# Patient Record
Sex: MALE | Race: WHITE | Employment: OTHER | URBAN - METROPOLITAN AREA
[De-identification: names, ages, dates, MRNs, and addresses within clinical notes are randomized per-mention and may not be internally consistent; named-entity substitution may affect disease eponyms.]

---

## 2022-09-09 ENCOUNTER — HOSPITAL ENCOUNTER (EMERGENCY)
Age: 82
Discharge: HOME | End: 2022-09-10
Attending: EMERGENCY MEDICINE
Payer: MEDICARE

## 2022-09-09 ENCOUNTER — APPOINTMENT (OUTPATIENT)
Dept: GENERAL RADIOLOGY | Age: 82
End: 2022-09-09
Payer: MEDICARE

## 2022-09-09 ENCOUNTER — APPOINTMENT (OUTPATIENT)
Dept: CT IMAGING | Age: 82
End: 2022-09-09
Payer: MEDICARE

## 2022-09-09 DIAGNOSIS — S12.111A CLOSED ODONTOID FRACTURE WITH TYPE II MORPHOLOGY AND POSTERIOR DISPLACEMENT, INITIAL ENCOUNTER (HCC): Primary | ICD-10-CM

## 2022-09-09 DIAGNOSIS — S12.090A CLOSED FRACTURE OF ANTERIOR ARCH OF ATLAS, INITIAL ENCOUNTER (HCC): ICD-10-CM

## 2022-09-09 LAB
ABSOLUTE EOS #: 0.08 K/UL (ref 0–0.44)
ABSOLUTE IMMATURE GRANULOCYTE: <0.03 K/UL (ref 0–0.3)
ABSOLUTE LYMPH #: 1.23 K/UL (ref 1.1–3.7)
ABSOLUTE MONO #: 0.58 K/UL (ref 0.1–1.2)
ALBUMIN SERPL-MCNC: 4.3 G/DL (ref 3.5–5.2)
ALBUMIN/GLOBULIN RATIO: 1.5 (ref 1–2.5)
ALP BLD-CCNC: 108 U/L (ref 40–129)
ALT SERPL-CCNC: 22 U/L (ref 5–41)
ANION GAP SERPL CALCULATED.3IONS-SCNC: 12 MMOL/L (ref 9–17)
AST SERPL-CCNC: 23 U/L
BASOPHILS # BLD: 1 % (ref 0–2)
BASOPHILS ABSOLUTE: 0.03 K/UL (ref 0–0.2)
BILIRUB SERPL-MCNC: 0.3 MG/DL (ref 0.3–1.2)
BUN BLDV-MCNC: 14 MG/DL (ref 8–23)
CALCIUM SERPL-MCNC: 9.7 MG/DL (ref 8.6–10.4)
CHLORIDE BLD-SCNC: 101 MMOL/L (ref 98–107)
CHP ED QC CHECK: YES
CO2: 28 MMOL/L (ref 20–31)
CREAT SERPL-MCNC: 0.83 MG/DL (ref 0.7–1.2)
EOSINOPHILS RELATIVE PERCENT: 1 % (ref 1–4)
GFR AFRICAN AMERICAN: >60 ML/MIN
GFR NON-AFRICAN AMERICAN: >60 ML/MIN
GFR SERPL CREATININE-BSD FRML MDRD: NORMAL ML/MIN/{1.73_M2}
GLUCOSE BLD-MCNC: 142 MG/DL
GLUCOSE BLD-MCNC: 94 MG/DL (ref 70–99)
HCT VFR BLD CALC: 41.3 % (ref 40.7–50.3)
HEMOGLOBIN: 13.3 G/DL (ref 13–17)
IMMATURE GRANULOCYTES: 0 %
LYMPHOCYTES # BLD: 19 % (ref 24–43)
MCH RBC QN AUTO: 28.2 PG (ref 25.2–33.5)
MCHC RBC AUTO-ENTMCNC: 32.2 G/DL (ref 28.4–34.8)
MCV RBC AUTO: 87.5 FL (ref 82.6–102.9)
MONOCYTES # BLD: 9 % (ref 3–12)
NRBC AUTOMATED: 0 PER 100 WBC
PDW BLD-RTO: 12.7 % (ref 11.8–14.4)
PLATELET # BLD: 161 K/UL (ref 138–453)
PMV BLD AUTO: 10 FL (ref 8.1–13.5)
POTASSIUM SERPL-SCNC: 3.8 MMOL/L (ref 3.7–5.3)
RBC # BLD: 4.72 M/UL (ref 4.21–5.77)
SARS-COV-2, RAPID: NOT DETECTED
SEG NEUTROPHILS: 70 % (ref 36–65)
SEGMENTED NEUTROPHILS ABSOLUTE COUNT: 4.46 K/UL (ref 1.5–8.1)
SODIUM BLD-SCNC: 141 MMOL/L (ref 135–144)
SPECIMEN DESCRIPTION: NORMAL
TOTAL PROTEIN: 7.2 G/DL (ref 6.4–8.3)
TROPONIN, HIGH SENSITIVITY: 17 NG/L (ref 0–22)
WBC # BLD: 6.4 K/UL (ref 3.5–11.3)

## 2022-09-09 PROCEDURE — 6360000002 HC RX W HCPCS: Performed by: STUDENT IN AN ORGANIZED HEALTH CARE EDUCATION/TRAINING PROGRAM

## 2022-09-09 PROCEDURE — 96374 THER/PROPH/DIAG INJ IV PUSH: CPT

## 2022-09-09 PROCEDURE — 93005 ELECTROCARDIOGRAM TRACING: CPT | Performed by: STUDENT IN AN ORGANIZED HEALTH CARE EDUCATION/TRAINING PROGRAM

## 2022-09-09 PROCEDURE — 80053 COMPREHEN METABOLIC PANEL: CPT

## 2022-09-09 PROCEDURE — 99223 1ST HOSP IP/OBS HIGH 75: CPT | Performed by: NEUROLOGICAL SURGERY

## 2022-09-09 PROCEDURE — 99285 EMERGENCY DEPT VISIT HI MDM: CPT

## 2022-09-09 PROCEDURE — 70450 CT HEAD/BRAIN W/O DYE: CPT

## 2022-09-09 PROCEDURE — 72040 X-RAY EXAM NECK SPINE 2-3 VW: CPT

## 2022-09-09 PROCEDURE — 84484 ASSAY OF TROPONIN QUANT: CPT

## 2022-09-09 PROCEDURE — 87635 SARS-COV-2 COVID-19 AMP PRB: CPT

## 2022-09-09 PROCEDURE — 85025 COMPLETE CBC W/AUTO DIFF WBC: CPT

## 2022-09-09 PROCEDURE — 72125 CT NECK SPINE W/O DYE: CPT

## 2022-09-09 RX ORDER — LOSARTAN POTASSIUM 25 MG/1
25 TABLET ORAL 2 TIMES DAILY
COMMUNITY

## 2022-09-09 RX ORDER — GLIMEPIRIDE 4 MG/1
4 TABLET ORAL 2 TIMES DAILY
COMMUNITY

## 2022-09-09 RX ORDER — FAMOTIDINE 40 MG/1
40 TABLET, FILM COATED ORAL DAILY
COMMUNITY

## 2022-09-09 RX ORDER — MEMANTINE HYDROCHLORIDE 10 MG/1
10 TABLET ORAL 2 TIMES DAILY
COMMUNITY

## 2022-09-09 RX ORDER — OXYBUTYNIN CHLORIDE 5 MG/1
5 TABLET, EXTENDED RELEASE ORAL DAILY
COMMUNITY

## 2022-09-09 RX ORDER — BUSPIRONE HYDROCHLORIDE 10 MG/1
30 TABLET ORAL 2 TIMES DAILY
COMMUNITY

## 2022-09-09 RX ORDER — SERTRALINE HYDROCHLORIDE 200 MG/1
CAPSULE ORAL
COMMUNITY

## 2022-09-09 RX ORDER — KETOROLAC TROMETHAMINE 15 MG/ML
15 INJECTION, SOLUTION INTRAMUSCULAR; INTRAVENOUS ONCE
Status: COMPLETED | OUTPATIENT
Start: 2022-09-09 | End: 2022-09-09

## 2022-09-09 RX ADMIN — KETOROLAC TROMETHAMINE 15 MG: 15 INJECTION, SOLUTION INTRAMUSCULAR; INTRAVENOUS at 17:53

## 2022-09-09 ASSESSMENT — PAIN DESCRIPTION - PAIN TYPE: TYPE: ACUTE PAIN

## 2022-09-09 ASSESSMENT — PAIN - FUNCTIONAL ASSESSMENT: PAIN_FUNCTIONAL_ASSESSMENT: 0-10

## 2022-09-09 ASSESSMENT — PAIN DESCRIPTION - DESCRIPTORS: DESCRIPTORS: ACHING

## 2022-09-09 ASSESSMENT — PAIN DESCRIPTION - LOCATION
LOCATION: NECK
LOCATION: HEAD;NECK

## 2022-09-09 ASSESSMENT — PAIN SCALES - GENERAL
PAINLEVEL_OUTOF10: 8
PAINLEVEL_OUTOF10: 8

## 2022-09-09 ASSESSMENT — PAIN DESCRIPTION - FREQUENCY: FREQUENCY: CONTINUOUS

## 2022-09-09 NOTE — ED NOTES
Patient arrives via [facility] from a rehab facility after falling out of a wheelchair and hitting his head. No LOC, no blood thinners. Patient is in rehab for a previous C1-C2 fracture. Patient arrives in a C-Collar with his only complaint being neck pain. Patient is alert and oriented x4.      Dagmar Rueda RN  09/09/22 0484

## 2022-09-09 NOTE — ED PROVIDER NOTES
[address] HANDOFF       Handoff taken on the following patient from prior Attending Physician:  Pt Name: Caio Rodriguez  PCP:  [address]  I was available and discussed any additional care issues that arose and coordinated the management plans with the resident(s) caring for the patient during my duty period. Any areas of disagreement with resident's documentation of care or procedures are noted on the chart. I was personally present for the key portions of any/all procedures during my duty period. I have documented in the chart those procedures where I was not present during the key portions. CHIEF COMPLAINT       Chief Complaint   Patient presents with    Fall     Patient fell out of wheelchair and rehab and hit head. No LOC. No blood thinners. Previous C1 and C2 fractures. CURRENT MEDICATIONS     Previous Medications  Previous Medications    BUSPIRONE (BUSPAR) 10 MG TABLET    Take 30 mg by mouth 2 times daily    FAMOTIDINE (PEPCID) 40 MG TABLET    Take 40 mg by mouth daily    GLIMEPIRIDE (AMARYL) 4 MG TABLET    Take 4 mg by mouth in the morning and at bedtime    LOSARTAN (COZAAR) 25 MG TABLET    Take 25 mg by mouth 2 times daily    MEMANTINE (NAMENDA) 10 MG TABLET    Take 10 mg by mouth 2 times daily    METFORMIN (GLUCOPHAGE) 1000 MG TABLET    Take 1,000 mg by mouth 2 times daily (with meals)    OXYBUTYNIN (DITROPAN-XL) 5 MG EXTENDED RELEASE TABLET    Take 5 mg by mouth daily    SERTRALINE  MG CAPS    Take by mouth       Encounter Medications  No orders of the defined types were placed in this encounter. ALLERGIES     is allergic to simvastatin. RECENT VITALS:   Temp: 97.6 °F (36.4 °C),  Heart Rate: 83, Resp: 20, BP: (!) 144/68    RADIOLOGY:   CT CERVICAL SPINE WO CONTRAST   Final Result      Cervical spine CT: Acute mildly displaced fracture involving the dense   process of C2 which appears to be 4 mm posteriorly displaced.   There is associated moderate thickening of the transverse ligament with indentation of   the anterior aspect of the spinal cord. Mildly displaced fracture of anterior arch of C1. Bilateral minimally displaced fractures of the lateral arch of C1 at the   level of foramen transversarium. Considering the location, CTA of the neck   is recommended for further evaluation of vertebral arteries. There is associated traumatic anterolisthesis of C1-C2. For further   evaluation of the spinal cord, cervical spine MRI is recommended. Head CT: No evidence for acute intracranial hemorrhage, territorial   infarction or intracranial mass lesion. Mild chronic microangiopathic ischemic disease. Mild generalized volume loss. Complete opacification left maxillary, left frontal sinus and left ethmoidal   air cells. There is suggestion of a calcified obstructing lesion within the   left maxillary sinus ostiomeatal unit. CT HEAD WO CONTRAST   Final Result      Cervical spine CT: Acute mildly displaced fracture involving the dense   process of C2 which appears to be 4 mm posteriorly displaced. There is   associated moderate thickening of the transverse ligament with indentation of   the anterior aspect of the spinal cord. Mildly displaced fracture of anterior arch of C1. Bilateral minimally displaced fractures of the lateral arch of C1 at the   level of foramen transversarium. Considering the location, CTA of the neck   is recommended for further evaluation of vertebral arteries. There is associated traumatic anterolisthesis of C1-C2. For further   evaluation of the spinal cord, cervical spine MRI is recommended. Head CT: No evidence for acute intracranial hemorrhage, territorial   infarction or intracranial mass lesion. Mild chronic microangiopathic ischemic disease. Mild generalized volume loss.       Complete opacification left maxillary, left frontal sinus and left ethmoidal air cells. There is suggestion of a calcified obstructing lesion within the   left maxillary sinus ostiomeatal unit. LABS:  Labs Reviewed   CBC WITH AUTO DIFFERENTIAL - Abnormal; Notable for the following components:       Result Value    Seg Neutrophils 70 (*)     Lymphocytes 19 (*)     All other components within normal limits   POCT GLUCOSE - Normal   COVID-19, RAPID   COMPREHENSIVE METABOLIC PANEL   TROPONIN           PLAN/ TASKS OUTSTANDING     This patient is a 80 y.o. Male. Patient has a prior C5 fracture after a fall, has been taking his brace off at his rehab facility and fell again today. He has noticed deficits however CT read shows a change in his cervical spine from prior CT. Plan for surgical involvement, will likely need to be admitted as family does not wish for him to go back to this rehab facility.     (Please note that portions of this note were completed with a voice recognition program.  Efforts were made to edit the dictations but occasionally words are mis-transcribed.)    Olga Lidia Nazario MD,, MD  Attending Emergency Physician       Olga Lidia Nazario MD  09/09/22 8700

## 2022-09-09 NOTE — ED PROVIDER NOTES
[address] [facility]  ED  Emergency Department Encounter  EmergencyMedicine Resident     Pt Susan Lyons  MRN: [id number]  [place] 1940  Date of evaluation: 9/9/22  PCP:  Anish Márquez       Chief Complaint   Patient presents with    Fall     Patient fell out of wheelchair and rehab and hit head. No LOC. No blood thinners. Previous C1 and C2 fractures. HISTORY OF PRESENT ILLNESS  (Location/Symptom, Timing/Onset, Context/Setting, Quality, Duration, Modifying Factors, Severity.)      Estuardo Graff is a 80 y.o. male who presents by EMS from outlying rehab facility. Patient had a fall at rehab facility today. Patient states he has had several falls recently. Patient states he felt generalized weakness \"all over my body\" prior to the episode and slid out of his wheelchair onto the ground. He states the weakness has now resolved but endorses continued neck pain. Patient is unsure if he hit his head. Of note, patient has prior C1, C2 injuries which is why he is currently in the rehab facility. Patient arrived wearing cervical collar. Patient does have history of Alzheimer's disease. Patient's wife later arrived to bedside and clarified that patient has been falling frequently and takes off his cervical collar in the rehab facility when he is unattended. Patient denies headache, dizziness, numbness, tingling, chest pain, shortness of breath, nausea, vomiting, abdominal pain. PAST MEDICAL / SURGICAL / SOCIAL / FAMILY HISTORY      has a past medical history of Diabetes mellitus ([address] History of Alzheimer's disease, DM, BPH, GERD, osteoporosis     has no past surgical history on file.   Patient unsure if he has had surgery    Social History     Socioeconomic History    Marital status: Single     Spouse name: Not on file    Number of children: Not on file    Years of education: Not on file    Highest education level: Not on file   Occupational History    Not on file   Tobacco Use    Smoking status: Not on file    Smokeless tobacco: Not on file   Substance and Sexual Activity    Alcohol use: Not on file    Drug use: Not on file    Sexual activity: Not on file   Other Topics Concern    Not on file   Social History Narrative    Not on file     Social Determinants of Health     Financial Resource Strain: Not on file   Food Insecurity: Not on file   Transportation Needs: Not on file   Physical Activity: Not on file   Stress: Not on file   Social Connections: Not on file   Intimate Partner Violence: Not on file   Housing Stability: Not on file       History reviewed. No pertinent family history. Allergies:  Simvastatin    Home Medications:  Prior to Admission medications    Medication Sig Start Date End Date Taking? Authorizing Provider   oxybutynin (DITROPAN-XL) 5 MG extended release tablet Take 5 mg by mouth daily   Yes Historical Provider, MD   Sertraline HCl 200 MG CAPS Take by mouth   Yes Historical Provider, MD   busPIRone (BUSPAR) 10 MG tablet Take 30 mg by mouth 2 times daily   Yes Historical Provider, MD   glimepiride (AMARYL) 4 MG tablet Take 4 mg by mouth in the morning and at bedtime   Yes Historical Provider, MD   losartan (COZAAR) 25 MG tablet Take 25 mg by mouth 2 times daily   Yes Historical Provider, MD   memantine (NAMENDA) 10 MG tablet Take 10 mg by mouth 2 times daily   Yes Historical Provider, MD   famotidine (PEPCID) 40 MG tablet Take 40 mg by mouth daily   Yes Historical Provider, MD   metFORMIN (GLUCOPHAGE) 1000 MG tablet Take 1,000 mg by mouth 2 times daily (with meals)   Yes Historical Provider, MD       REVIEW OF SYSTEMS    (2-9 systems for level 4, 10 or more for level 5)      Review of Systems   Unable to perform ROS: Dementia   Musculoskeletal:  Positive for neck pain.      PHYSICAL EXAM   (up to 7 for level 4, 8 or more for level 5)      INITIAL VITALS:   BP (!) 144/68   Pulse 83   Temp 97.6 °F (36.4 °C) (Oral)   Resp 20   Ht 5' 10\" (1.778 m)   Wt 160 lb (72.6 kg)   SpO2 95%   BMI 22.96 kg/m²     Physical Exam  Vitals reviewed. Constitutional:       General: He is not in acute distress. HENT:      Head: Normocephalic and atraumatic. Right Ear: Tympanic membrane normal.      Left Ear: Tympanic membrane normal.      Ears:      Comments: No hemotympanum. No wright sign. Nose: Nose normal. No rhinorrhea (No epistaxis. Nasal passages clear. No CSF otorrhea or rhinorrhea). Mouth/Throat:      Mouth: Mucous membranes are moist.      Pharynx: Oropharynx is clear. Eyes:      Extraocular Movements: Extraocular movements intact. Pupils: Pupils are equal, round, and reactive to light. Comments: No raccoon eyes   Neck:      Comments: Cervical collar in place for prior C1, C2 injuries. Neck pain. Cardiovascular:      Rate and Rhythm: Normal rate and regular rhythm. Pulses: Normal pulses. Heart sounds: Normal heart sounds. Pulmonary:      Effort: Pulmonary effort is normal.      Breath sounds: Normal breath sounds. No stridor. No wheezing or rhonchi. Comments: Bilateral breath sounds on auscultation  Abdominal:      Palpations: Abdomen is soft. Tenderness: There is no abdominal tenderness. There is no guarding or rebound. Musculoskeletal:         General: Normal range of motion. Cervical back: Normal range of motion and neck supple. No tenderness. Skin:     General: Skin is warm. Capillary Refill: Capillary refill takes less than 2 seconds. Findings: No bruising. Neurological:      General: No focal deficit present. Mental Status: He is alert and oriented to person, place, and time. Motor: No weakness.        DIFFERENTIAL  DIAGNOSIS     PLAN (LABS / IMAGING / EKG):  Orders Placed This Encounter   Procedures    COVID-19, Rapid    CT CERVICAL SPINE WO CONTRAST    CT HEAD WO CONTRAST    CBC with Auto Differential    Comprehensive Metabolic Panel    Troponin    Inpatient consult to Trauma Surgery Inpatient consult to Neurosurgery    POCT Glucose    EKG 12 Lead    EKG 12 Lead       MEDICATIONS ORDERED:  No orders of the defined types were placed in this encounter. DDX: Cervical fracture, subdural, epidural, subarachnoid, skull fracture, closed head injury, concussion      DIAGNOSTIC RESULTS / EMERGENCY DEPARTMENT COURSE / MDM   LAB RESULTS:  Results for orders placed or performed during the hospital encounter of 09/09/22   COVID-19, Rapid    Specimen: Nasopharyngeal Swab   Result Value Ref Range    Specimen Description . NASOPHARYNGEAL SWAB     SARS-CoV-2, Rapid Not Detected Not Detected   CBC with Auto Differential   Result Value Ref Range    WBC 6.4 3.5 - 11.3 k/uL    RBC 4.72 4.21 - 5.77 m/uL    Hemoglobin 13.3 13.0 - 17.0 g/dL    Hematocrit 41.3 40.7 - 50.3 %    MCV 87.5 82.6 - 102.9 fL    MCH 28.2 25.2 - 33.5 pg    MCHC 32.2 28.4 - 34.8 g/dL    RDW 12.7 11.8 - 14.4 %    Platelets 263 269 - 451 k/uL    MPV 10.0 8.1 - 13.5 fL    NRBC Automated 0.0 0.0 per 100 WBC    Seg Neutrophils 70 (H) 36 - 65 %    Lymphocytes 19 (L) 24 - 43 %    Monocytes 9 3 - 12 %    Eosinophils % 1 1 - 4 %    Basophils 1 0 - 2 %    Immature Granulocytes 0 0 %    Segs Absolute 4.46 1.50 - 8.10 k/uL    Absolute Lymph # 1.23 1.10 - 3.70 k/uL    Absolute Mono # 0.58 0.10 - 1.20 k/uL    Absolute Eos # 0.08 0.00 - 0.44 k/uL    Basophils Absolute 0.03 0.00 - 0.20 k/uL    Absolute Immature Granulocyte <0.03 0.00 - 0.30 k/uL   Comprehensive Metabolic Panel   Result Value Ref Range    Glucose 94 70 - 99 mg/dL    BUN 14 8 - 23 mg/dL    Creatinine 0.83 0.70 - 1.20 mg/dL    Calcium 9.7 8.6 - 10.4 mg/dL    Sodium 141 135 - 144 mmol/L    Potassium 3.8 3.7 - 5.3 mmol/L    Chloride 101 98 - 107 mmol/L    CO2 28 20 - 31 mmol/L    Anion Gap 12 9 - 17 mmol/L    Alkaline Phosphatase 108 40 - 129 U/L    ALT 22 5 - 41 U/L    AST 23 <40 U/L    Total Bilirubin 0.3 0.3 - 1.2 mg/dL    Total Protein 7.2 6.4 - 8.3 g/dL    Albumin 4.3 3.5 - 5.2 g/dL Albumin/Globulin Ratio 1.5 1.0 - 2.5    GFR Non-African American >60 >60 mL/min    GFR African American >60 >60 mL/min    GFR Comment         Troponin   Result Value Ref Range    Troponin, High Sensitivity 17 0 - 22 ng/L   POCT Glucose   Result Value Ref Range    Glucose 142 mg/dL    QC OK? Yes        IMPRESSION: 81 yo M presents with fall, recent C1 and C2 fracture. History of Alzheimer's dementia, per wife patient has been falling a lot recently and is not remembering to keep his C collar on so removes it frequently. Fall today, generalized weakness and slid out of his wheelchair onto floor. Unknown LOC. No AC. No focal neurologic deficits. NIH 0. Does have memory impairment, frequently changes answers to ROS questions and does not recall earlier answers, unreliable historian. No hemotympanum, no wright sign, no raccoon eyes. Airway intact. Heart RRR. Lungs CTAB. Abdomen soft and nontender. + midline cervical tenderness, in C collar. No midline thoracic or lumbar tenderness, no step offs or deformities. Patient does have a neurosurgeon at [facility], wife states his rehab facility is closer to this facility and he was transferred here. If he requires surgery, wife would prefer that he be transferred to [facility] to see his neurosurgeon. Will obtain CT head and C-spine. Do not feel patient requires a CTA at this time given lack of neurologic symptoms on examination. Previous CT C spine imaging from 8/25/2022 at [facility] radiology reading:  \"1. No intracranial hemorrhage, acute infarct, or space-occupying mass lesion. 2. Stable chronic left paranasal sinus disease. 3. Stable moderately advanced senescent change. 4. Acute appearing mildly displaced type II dens fracture. 5. Minimally distracted anterior arch fracture C1 with nondistracted posterior arch fractures bilaterally. The posterior fractures extend into the bilateral transverse foramina.  CT angiogram of the head and neck is recommended to assess for potential associated vascular injury. \"    Findings on our CT C spine, including moderate thickening of the transverse ligament with indentation of the anterior aspect of the spinal cord, displaced fractures of the lateral arch of C1, and anterolisthesis of C1 and C2, are not noted in original CT. Difficulty obtaining original imaging on 8/25 through our EMR. Will consult trauma surgery and neurosurgery. RADIOLOGY:  CT HEAD WO CONTRAST    Result Date: 9/9/2022  EXAMINATION: CT OF THE HEAD WITHOUT CONTRAST; CT OF THE CERVICAL SPINE WITHOUT CONTRAST 9/9/2022 2:07 pm TECHNIQUE: CT of the head was performed without the administration of intravenous contrast. Automated exposure control, iterative reconstruction, and/or weight based adjustment of the mA/kV was utilized to reduce the radiation dose to as low as reasonably achievable.; CT of the cervical spine was performed without the administration of intravenous contrast. Multiplanar reformatted images are provided for review. Automated exposure control, iterative reconstruction, and/or weight based adjustment of the mA/kV was utilized to reduce the radiation dose to as low as reasonably achievable. COMPARISON: None. HISTORY: ORDERING SYSTEM PROVIDED HISTORY: Fall, hit head. No LOC. Recent C1 and C2 fracture TECHNOLOGIST PROVIDED HISTORY: Fall, hit head. No LOC. Recent C1 and C2 fracture Decision Support Exception - unselect if not a suspected or confirmed emergency medical condition->Emergency Medical Condition (MA) Reason for Exam: Fall, hit head. No LOC. Recent C1 and C2 fracture FINDINGS: Cervical spine: BONES/ALIGNMENT: Acute mildly displaced fracture involving the dense process of C2 which appears to be 4 mm posteriorly displaced. There is associated moderate thickening of the transverse ligament with indentation of the anterior aspect of the spinal cord. Mildly displaced fracture of anterior arch of C1.  Bilateral minimally displaced fractures of the lateral arch of C1 at the level of foramen transversarium. There is associated traumatic anterolisthesis of C1-C2. DEGENERATIVE CHANGES: No significant degenerative changes. SOFT TISSUES: There is no prevertebral soft tissue swelling. Head CT: There is no acute infarction, intracranial hemorrhage or intracranial mass lesion. No mass effect, midline shift or extra-axial collection is noted. There are mild nonspecific foci of periventricular and subcortical cerebral white matter hypodensity, most likely representing chronic microangiopathic disease in this age group. The brain parenchyma is otherwise normal. The cerebellar tonsils are in normal position. The ventricles, sulci, and cisterns are mildly prominent suggestive of mild generalized volume loss. The globes and orbits are within normal limits. Complete opacification left maxillary, left frontal sinus and left ethmoidal air cells. There is suggestion of a calcified obstructing lesion within the left maxillary sinus ostiomeatal unit. The visualized extracranial structures including paranasal sinuses and mastoid air cells are otherwise unremarkable. No fracture is identified. Cervical spine CT: Acute mildly displaced fracture involving the dense process of C2 which appears to be 4 mm posteriorly displaced. There is associated moderate thickening of the transverse ligament with indentation of the anterior aspect of the spinal cord. Mildly displaced fracture of anterior arch of C1. Bilateral minimally displaced fractures of the lateral arch of C1 at the level of foramen transversarium. Considering the location, CTA of the neck is recommended for further evaluation of vertebral arteries. There is associated traumatic anterolisthesis of C1-C2. For further evaluation of the spinal cord, cervical spine MRI is recommended. Head CT: No evidence for acute intracranial hemorrhage, territorial infarction or intracranial mass lesion.  Mild chronic microangiopathic ischemic disease. Mild generalized volume loss. Complete opacification left maxillary, left frontal sinus and left ethmoidal air cells. There is suggestion of a calcified obstructing lesion within the left maxillary sinus ostiomeatal unit. CT CERVICAL SPINE WO CONTRAST    Result Date: 9/9/2022  EXAMINATION: CT OF THE HEAD WITHOUT CONTRAST; CT OF THE CERVICAL SPINE WITHOUT CONTRAST 9/9/2022 2:07 pm TECHNIQUE: CT of the head was performed without the administration of intravenous contrast. Automated exposure control, iterative reconstruction, and/or weight based adjustment of the mA/kV was utilized to reduce the radiation dose to as low as reasonably achievable.; CT of the cervical spine was performed without the administration of intravenous contrast. Multiplanar reformatted images are provided for review. Automated exposure control, iterative reconstruction, and/or weight based adjustment of the mA/kV was utilized to reduce the radiation dose to as low as reasonably achievable. COMPARISON: None. HISTORY: ORDERING SYSTEM PROVIDED HISTORY: Fall, hit head. No LOC. Recent C1 and C2 fracture TECHNOLOGIST PROVIDED HISTORY: Fall, hit head. No LOC. Recent C1 and C2 fracture Decision Support Exception - unselect if not a suspected or confirmed emergency medical condition->Emergency Medical Condition (MA) Reason for Exam: Fall, hit head. No LOC. Recent C1 and C2 fracture FINDINGS: Cervical spine: BONES/ALIGNMENT: Acute mildly displaced fracture involving the dense process of C2 which appears to be 4 mm posteriorly displaced. There is associated moderate thickening of the transverse ligament with indentation of the anterior aspect of the spinal cord. Mildly displaced fracture of anterior arch of C1. Bilateral minimally displaced fractures of the lateral arch of C1 at the level of foramen transversarium. There is associated traumatic anterolisthesis of C1-C2.  DEGENERATIVE CHANGES: No significant degenerative changes. SOFT TISSUES: There is no prevertebral soft tissue swelling. Head CT: There is no acute infarction, intracranial hemorrhage or intracranial mass lesion. No mass effect, midline shift or extra-axial collection is noted. There are mild nonspecific foci of periventricular and subcortical cerebral white matter hypodensity, most likely representing chronic microangiopathic disease in this age group. The brain parenchyma is otherwise normal. The cerebellar tonsils are in normal position. The ventricles, sulci, and cisterns are mildly prominent suggestive of mild generalized volume loss. The globes and orbits are within normal limits. Complete opacification left maxillary, left frontal sinus and left ethmoidal air cells. There is suggestion of a calcified obstructing lesion within the left maxillary sinus ostiomeatal unit. The visualized extracranial structures including paranasal sinuses and mastoid air cells are otherwise unremarkable. No fracture is identified. Cervical spine CT: Acute mildly displaced fracture involving the dense process of C2 which appears to be 4 mm posteriorly displaced. There is associated moderate thickening of the transverse ligament with indentation of the anterior aspect of the spinal cord. Mildly displaced fracture of anterior arch of C1. Bilateral minimally displaced fractures of the lateral arch of C1 at the level of foramen transversarium. Considering the location, CTA of the neck is recommended for further evaluation of vertebral arteries. There is associated traumatic anterolisthesis of C1-C2. For further evaluation of the spinal cord, cervical spine MRI is recommended. Head CT: No evidence for acute intracranial hemorrhage, territorial infarction or intracranial mass lesion. Mild chronic microangiopathic ischemic disease. Mild generalized volume loss. Complete opacification left maxillary, left frontal sinus and left ethmoidal air cells.  There is suggestion of a calcified obstructing lesion within the left maxillary sinus ostiomeatal unit. EKG  EKG: Sinus rhythm, rate 78. Irregular rhythm. Normal axis. Single PVC. Inverted T waves in V3, V4. All EKG's are interpreted by the Emergency Department Physician who either signs or Co-signs this chart in the absence of a cardiologist.    EMERGENCY DEPARTMENT COURSE:  ED Course as of 09/09/22 1739   Fri Sep 09, 2022   1303 POC glucose 142 [JG]      ED Course User Index  [JG] Aayush Castillo MD       CONSULTS:  IP CONSULT TO TRAUMA SURGERY  IP CONSULT TO NEUROSURGERY    FINAL IMPRESSION      1. Closed odontoid fracture with type II morphology and posterior displacement, initial encounter ([facility] [address]    2. Closed fracture of anterior arch of atlas, initial encounter ([facility] [address]          DISPOSITION / PLAN     DISPOSITION Decision To Admit 09/09/2022 03:26:55 PM      PATIENT REFERRED TO:  No follow-up provider specified.     DISCHARGE MEDICATIONS:  New Prescriptions    No medications on file       Aayush Castillo MD  Emergency Medicine Resident    (Please note that portions of thisnote were completed with a voice recognition program.  Efforts were made to edit the dictations but occasionally words are mis-transcribed.)       Aayush Castillo MD  Resident  09/09/22 [id number]

## 2022-09-09 NOTE — PROGRESS NOTES
[facility]     Emergency/Trauma Note    PATIENT NAME: Lory Llanos    Shift date: 09/09/22  Shift day: Friday   Shift # 2    Room # 25/25   Name: Lory Llanos            Age: 80 y.o. Gender: male          Restorationist: [id number] of [employer]:      Trauma/Incident type: Adult Trauma Consult  Admit Date & Time: 9/9/2022 12:52 PM  TRAUMA NAME: n/a    ADVANCE DIRECTIVES IN CHART? No    NAME OF DECISION MAKER: Griselda Hammer ([phone])    RELATIONSHIP OF DECISION MAKER TO PATIENT: Spouse    PATIENT/EVENT DESCRIPTION:  Lory Llanos is a 80 y.o. male who arrived via (transport) from (scene/accident/event) as a result of a fall. Pt to be admitted to 25/25. SPIRITUAL ASSESSMENT-INTERVENTION-OUTCOME:  I met w/ pt and pt's wife in ed room. Engaged in brief conversation w/ pt's spouse providing ministry of presence. Pt's wife stated she is hoping pt can be transferred to a hospital nearer to their home. This writer spoke a brief prayer for comfort. Pt and pt's spouse expressed gratitude for  visit     PATIENT BELONGINGS:  No belongings noted    ANY BELONGINGS OF SIGNIFICANT VALUE NOTED:  N/a    REGISTRATION STAFF NOTIFIED?   Yes      WHAT IS YOUR SPIRITUAL CARE PLAN FOR THIS PATIENT?:   Chaplains will remain available to follow up with pt and family as needed    Electronically signed by Pollo Garcia on 9/9/2022 at 4:26 PM.  [facility]  [phone]       09/09/22 1622   Encounter Summary   Service Provided For: Patient and family together   Referral/Consult From: Multi-disciplinary team   Support System Spouse   Last Encounter  09/09/22   Complexity of Encounter Moderate   Begin Time 1605   End Time  1620   Total Time Calculated 15 min   Encounter    Type Initial Screen/Assessment   Crisis   Type Trauma   Assessment/Intervention/Outcome   Assessment Anxious   Intervention Active listening;Sustaining Presence/Ministry of presence   Outcome Engaged in conversation

## 2022-09-09 NOTE — CONSULTS
Afshan  22.    Department of Neurosurgery  Resident Consult Note      Reason for Consult:  C1 C2 fractures   Requesting Physician:  Dr. Jessica Omer:   [x]Dr. Jose Alonzo  []Dr. Radha Hernandez  []Dr. Myra Caal  []Dr. Jennifer Chaudhary  []Dr. Piotr Sherwood  []Dr. Siddhartha Key    History Obtained From:  patient, electronic medical record, care team    CHIEF COMPLAINT:         Fall    HISTORY OF PRESENT ILLNESS:       The patient is a 80 y.o. male who presents with known C1 and C2 fractures, transferred from rehab facility. Patient was seen at [facility] on 8/25/2022. Patient has been at his rehab facility for about 1 week per family at bedside. Patient fell today and was not wearing his cervical collar as he continues to take it off at his rehab facility. EMS transferred patient here. Patient is pleasantly demented. Is complaining of neck pain although states that it is worse when pushed on or comes into contact with bed. Patient complaining of generalized weakness although no numbness or tingling, specific arm weakness. Neurosurgery consulted due to new findings on CT cervical spine at our facility. Findings reviewed from previous facility, unable to visualize imaging although did review radiology report with concerns for our radiologist noting transverse ligament thickening as well as 4 mm posterior displaced dens process of C2 and bilaterally minimally displaced fractures of the lateral arch of C1 at the level of the foramen transversarium    PAST MEDICAL HISTORY :       Past Medical History:        Diagnosis Date    Diabetes mellitus ([facility] [address]        Past Surgical History:    History reviewed. No pertinent surgical history.     Social History:   Social History     Socioeconomic History    Marital status: Single     Spouse name: Not on file    Number of children: Not on file    Years of education: Not on file    Highest education level: Not on file   Occupational History    Not on file   Tobacco Use - No abdominal pain, no nausea, no vomiting  Genito-Urinary ROS - No dysuria  Musculoskeletal ROS - no back pain, + neck pain  Neurological ROS - No focal weakness or loss of sensation, no headache  Dermatological ROS - No lesions, no rash  Vascular/Lymphatic ROS - No edema    PHYSICAL EXAM:       Vitals:    09/09/22 1308   BP:    Pulse: 83   Resp:    Temp:    SpO2:        CONSTITUTIONAL:  Well developed, well nourished, alert and oriented x 2, baseline dementia, pleasantly demented, in no acute distress, nontoxic. No dysarthria, no aphasia. EOMI.     HEAD:  normocephalic, atraumatic    EYES:  PERRL, EOMI   ENT:  moist mucous membranes, no stridor, no tracheal deviation   NECK:  +midline tenderness to palpation, no step-offs or deformities, in C-collar   BACK:  no midline tenderness to palpation, no step-offs or deformities    LUNGS:  Symmetrical chest rise, no acute respiratory distress    CARDIOVASCULAR:  RRR on the monitor   ABDOMEN:  Soft, no rigidity   NEUROLOGIC:  EYE OPENING     Spontaneous - 4 [x]       To voice - 3 []       To pain - 2 []       None - 1 []    VERBAL RESPONSE     Appropriate, oriented - 5 []       Dazed or confused - 4 [x]  Basline dementia, patient is at baseline     Syllables, expletives - 3 []       Grunts - 2 []       None - 1 []    MOTOR RESPONSE     Spontaneous, command - 6 [x]       Localizes pain - 5 []       Withdraws pain - 4 []       Abnormal flexion - 3 []       Abnormal extension - 2 []       None - 1 []            Total GCS: 14    MENTAL STATUS:  Not oriented to date, awake               Cranial Nerves:    II: Visual fields:  normal  III: Pupils:  equal, round, reactive to light  III,IV,VI: Extra Ocular Movements: intact  V: Facial sensation:  intact  VII: Facial strength: intact  XI: Shoulder shrug:  intact  XII: Tongue movement:  normal    MOTOR EXAM:    Drift:  absent  Tone:  normal    Motor exam is symmetrical 5 out of 5 all extremities bilaterally        SENSORY:    Touch: Right Upper Extremity:  normal  Left Upper Extremity:  normal  Right Lower Extremity:  normal  Left Lower Extremity:  normal      Gait:  not evaluated secondary to fall risk    SKIN:  no rash      LABS AND IMAGING:     Labs:  CBC with Differential:    Lab Results   Component Value Date/Time    WBC 6.4 09/09/2022 03:07 PM    RBC 4.72 09/09/2022 03:07 PM    HGB 13.3 09/09/2022 03:07 PM    HCT 41.3 09/09/2022 03:07 PM     09/09/2022 03:07 PM    MCV 87.5 09/09/2022 03:07 PM    MCH 28.2 09/09/2022 03:07 PM    MCHC 32.2 09/09/2022 03:07 PM    RDW 12.7 09/09/2022 03:07 PM    LYMPHOPCT 19 09/09/2022 03:07 PM    MONOPCT 9 09/09/2022 03:07 PM    BASOPCT 1 09/09/2022 03:07 PM    MONOSABS 0.58 09/09/2022 03:07 PM    LYMPHSABS 1.23 09/09/2022 03:07 PM    EOSABS 0.08 09/09/2022 03:07 PM    BASOSABS 0.03 09/09/2022 03:07 PM     BMP:    Lab Results   Component Value Date/Time     09/09/2022 03:07 PM    K 3.8 09/09/2022 03:07 PM     09/09/2022 03:07 PM    CO2 28 09/09/2022 03:07 PM    BUN 14 09/09/2022 03:07 PM    LABALBU 4.3 09/09/2022 03:07 PM    CREATININE 0.83 09/09/2022 03:07 PM    CALCIUM 9.7 09/09/2022 03:07 PM    GFRAA >60 09/09/2022 03:07 PM    LABGLOM >60 09/09/2022 03:07 PM    GLUCOSE 94 09/09/2022 03:07 PM       Radiology Review:    CT Cervical from [facility] read reviewed 8/25/2022:     Impression:   1. No intracranial hemorrhage, acute infarct, or space-occupying mass lesion. 2. Stable chronic left paranasal sinus disease. 3. Stable moderately advanced senescent change. 4. Acute appearing mildly displaced type II dens fracture. 5. Minimally distracted anterior arch fracture C1 with nondistracted posterior arch fractures bilaterally. The posterior fractures extend into the bilateral transverse foramina. CT angiogram of the head and neck is recommended to assess for potential associated vascular injury.      CT Cervical 9/9/2022:   Cervical spine CT: Acute mildly displaced fracture involving the dense   process of C2 which appears to be 4 mm posteriorly displaced. There is   associated moderate thickening of the transverse ligament with indentation of   the anterior aspect of the spinal cord. Mildly displaced fracture of anterior arch of C1. Bilateral minimally displaced fractures of the lateral arch of C1 at the   level of foramen transversarium. Considering the location, CTA of the neck   is recommended for further evaluation of vertebral arteries. There is associated traumatic anterolisthesis of C1-C2. For further   evaluation of the spinal cord, cervical spine MRI is recommended. Head CT: No evidence for acute intracranial hemorrhage, territorial   infarction or intracranial mass lesion. Mild chronic microangiopathic ischemic disease. Mild generalized volume loss. Complete opacification left maxillary, left frontal sinus and left ethmoidal   air cells. There is suggestion of a calcified obstructing lesion within the   left maxillary sinus ostiomeatal unit. ASSESSMENT AND PLAN:     There is no problem list on file for this patient. A/P:  Cat Bill is a 80 y.o. male who presents after fall from standing at rehab facility, known c1 and c2 fracture previously, new findings on ct imaging. Patient care will be discussed with attending, will reevaluate patient along with attending    - Will obtain upright cervical films, pending films, patient to be admitted vs discharge with outpatient follow up with prior surgeon  - CTLS recommendations: maintain cervical collar at all timess   - Neuro checks per protocol      Additional recommendations may follow    Please contact neurosurgery with any changes in patient's neurologic status. Thank you for your consult.        Dr. Taylor Sawyer, DO   Emergency Medicine Resident, PGY-3  Neurosurgery/Neuro Critical Care Service  9/9/2022 4:34 PM      Please note that this chart was generated

## 2022-09-09 NOTE — ED PROVIDER NOTES
[address] [facility]  Emergency Department  Emergency Medicine Resident Turn-Over     Care of Candance Floras was assumed from Dr. Dory Rust and is being seen for Fall (Patient fell out of wheelchair and rehab and hit head. No LOC. No blood thinners. Previous C1 and C2 fractures.)  . The patient's initial evaluation and plan have been discussed with the prior provider who initially evaluated the patient. EMERGENCY DEPARTMENT COURSE / MEDICAL DECISION MAKING:       MEDICATIONS GIVEN:  Orders Placed This Encounter   Medications    ketorolac (TORADOL) injection 15 mg       LABS / RADIOLOGY:     Labs Reviewed   CBC WITH AUTO DIFFERENTIAL - Abnormal; Notable for the following components:       Result Value    Seg Neutrophils 70 (*)     Lymphocytes 19 (*)     All other components within normal limits   POCT GLUCOSE - Normal   COVID-19, RAPID   COMPREHENSIVE METABOLIC PANEL   TROPONIN       CT HEAD WO CONTRAST    Result Date: 9/9/2022  EXAMINATION: CT OF THE HEAD WITHOUT CONTRAST; CT OF THE CERVICAL SPINE WITHOUT CONTRAST 9/9/2022 2:07 pm TECHNIQUE: CT of the head was performed without the administration of intravenous contrast. Automated exposure control, iterative reconstruction, and/or weight based adjustment of the mA/kV was utilized to reduce the radiation dose to as low as reasonably achievable.; CT of the cervical spine was performed without the administration of intravenous contrast. Multiplanar reformatted images are provided for review. Automated exposure control, iterative reconstruction, and/or weight based adjustment of the mA/kV was utilized to reduce the radiation dose to as low as reasonably achievable. COMPARISON: None. HISTORY: ORDERING SYSTEM PROVIDED HISTORY: Fall, hit head. No LOC. Recent C1 and C2 fracture TECHNOLOGIST PROVIDED HISTORY: Fall, hit head. No LOC.  Recent C1 and C2 fracture Decision Support Exception - unselect if not a suspected or confirmed emergency medical condition->Emergency Medical Condition (MA) Reason for Exam: Fall, hit head. No LOC. Recent C1 and C2 fracture FINDINGS: Cervical spine: BONES/ALIGNMENT: Acute mildly displaced fracture involving the dense process of C2 which appears to be 4 mm posteriorly displaced. There is associated moderate thickening of the transverse ligament with indentation of the anterior aspect of the spinal cord. Mildly displaced fracture of anterior arch of C1. Bilateral minimally displaced fractures of the lateral arch of C1 at the level of foramen transversarium. There is associated traumatic anterolisthesis of C1-C2. DEGENERATIVE CHANGES: No significant degenerative changes. SOFT TISSUES: There is no prevertebral soft tissue swelling. Head CT: There is no acute infarction, intracranial hemorrhage or intracranial mass lesion. No mass effect, midline shift or extra-axial collection is noted. There are mild nonspecific foci of periventricular and subcortical cerebral white matter hypodensity, most likely representing chronic microangiopathic disease in this age group. The brain parenchyma is otherwise normal. The cerebellar tonsils are in normal position. The ventricles, sulci, and cisterns are mildly prominent suggestive of mild generalized volume loss. The globes and orbits are within normal limits. Complete opacification left maxillary, left frontal sinus and left ethmoidal air cells. There is suggestion of a calcified obstructing lesion within the left maxillary sinus ostiomeatal unit. The visualized extracranial structures including paranasal sinuses and mastoid air cells are otherwise unremarkable. No fracture is identified. Cervical spine CT: Acute mildly displaced fracture involving the dense process of C2 which appears to be 4 mm posteriorly displaced. There is associated moderate thickening of the transverse ligament with indentation of the anterior aspect of the spinal cord.  Mildly displaced fracture of anterior arch of C1. Bilateral minimally displaced fractures of the lateral arch of C1 at the level of foramen transversarium. Considering the location, CTA of the neck is recommended for further evaluation of vertebral arteries. There is associated traumatic anterolisthesis of C1-C2. For further evaluation of the spinal cord, cervical spine MRI is recommended. Head CT: No evidence for acute intracranial hemorrhage, territorial infarction or intracranial mass lesion. Mild chronic microangiopathic ischemic disease. Mild generalized volume loss. Complete opacification left maxillary, left frontal sinus and left ethmoidal air cells. There is suggestion of a calcified obstructing lesion within the left maxillary sinus ostiomeatal unit. CT CERVICAL SPINE WO CONTRAST    Result Date: 9/9/2022  EXAMINATION: CT OF THE HEAD WITHOUT CONTRAST; CT OF THE CERVICAL SPINE WITHOUT CONTRAST 9/9/2022 2:07 pm TECHNIQUE: CT of the head was performed without the administration of intravenous contrast. Automated exposure control, iterative reconstruction, and/or weight based adjustment of the mA/kV was utilized to reduce the radiation dose to as low as reasonably achievable.; CT of the cervical spine was performed without the administration of intravenous contrast. Multiplanar reformatted images are provided for review. Automated exposure control, iterative reconstruction, and/or weight based adjustment of the mA/kV was utilized to reduce the radiation dose to as low as reasonably achievable. COMPARISON: None. HISTORY: ORDERING SYSTEM PROVIDED HISTORY: Fall, hit head. No LOC. Recent C1 and C2 fracture TECHNOLOGIST PROVIDED HISTORY: Fall, hit head. No LOC. Recent C1 and C2 fracture Decision Support Exception - unselect if not a suspected or confirmed emergency medical condition->Emergency Medical Condition (MA) Reason for Exam: Fall, hit head. No LOC.  Recent C1 and C2 fracture FINDINGS: Cervical spine: BONES/ALIGNMENT: Acute mildly displaced fracture involving the dense process of C2 which appears to be 4 mm posteriorly displaced. There is associated moderate thickening of the transverse ligament with indentation of the anterior aspect of the spinal cord. Mildly displaced fracture of anterior arch of C1. Bilateral minimally displaced fractures of the lateral arch of C1 at the level of foramen transversarium. There is associated traumatic anterolisthesis of C1-C2. DEGENERATIVE CHANGES: No significant degenerative changes. SOFT TISSUES: There is no prevertebral soft tissue swelling. Head CT: There is no acute infarction, intracranial hemorrhage or intracranial mass lesion. No mass effect, midline shift or extra-axial collection is noted. There are mild nonspecific foci of periventricular and subcortical cerebral white matter hypodensity, most likely representing chronic microangiopathic disease in this age group. The brain parenchyma is otherwise normal. The cerebellar tonsils are in normal position. The ventricles, sulci, and cisterns are mildly prominent suggestive of mild generalized volume loss. The globes and orbits are within normal limits. Complete opacification left maxillary, left frontal sinus and left ethmoidal air cells. There is suggestion of a calcified obstructing lesion within the left maxillary sinus ostiomeatal unit. The visualized extracranial structures including paranasal sinuses and mastoid air cells are otherwise unremarkable. No fracture is identified. Cervical spine CT: Acute mildly displaced fracture involving the dense process of C2 which appears to be 4 mm posteriorly displaced. There is associated moderate thickening of the transverse ligament with indentation of the anterior aspect of the spinal cord. Mildly displaced fracture of anterior arch of C1. Bilateral minimally displaced fractures of the lateral arch of C1 at the level of foramen transversarium.   Considering the location, CTA of the neck is recommended for anterior arch of atlas, initial encounter ([facility] [address]        DISPOSITION:         DISPOSITION:  [x]  Discharge back to acute rehab   []  Transfer -    []  Admission -     []  Against Medical Advice   []  Eloped   FOLLOW-UP: [address] [facility]  [facility]  [phone]    If symptoms worsen   DISCHARGE MEDICATIONS: Discharge Medication List as of 9/9/2022 10:59 PM              Frida Shields MD  Emergency Medicine Resident  [address] [facility]      Frida Shields MD  Resident  09/10/22 6020

## 2022-09-09 NOTE — ED NOTES
Per Epic, patient has alzheimer's, writer unable to conduct ACP.       GASTON Villanueva  09/09/22 3150

## 2022-09-09 NOTE — CONSULTS
Provider, MD   Sertraline HCl 200 MG CAPS Take by mouth   Yes Historical Provider, MD   busPIRone (BUSPAR) 10 MG tablet Take 30 mg by mouth 2 times daily   Yes Historical Provider, MD   glimepiride (AMARYL) 4 MG tablet Take 4 mg by mouth in the morning and at bedtime   Yes Historical Provider, MD   losartan (COZAAR) 25 MG tablet Take 25 mg by mouth 2 times daily   Yes Historical Provider, MD   memantine (NAMENDA) 10 MG tablet Take 10 mg by mouth 2 times daily   Yes Historical Provider, MD   famotidine (PEPCID) 40 MG tablet Take 40 mg by mouth daily   Yes Historical Provider, MD   metFORMIN (GLUCOPHAGE) 1000 MG tablet Take 1,000 mg by mouth 2 times daily (with meals)   Yes Historical Provider, MD    Scheduled Meds:  Continuous Infusions:  PRN Meds:. Allergies  is allergic to simvastatin. Family History  family history is not on file. Social History   has no history on file for tobacco use.   has no history on file for alcohol use.   has no history on file for drug use. Review of Systems  General Denies any fever or chills  HEENT  Denies any diplopia, tinnitus or vertigo  Resp Denies any shortness of breath, cough or wheezing  Cardiac Denies any chest pain, palpitations, claudication or edema  GI Denies any melena, hematochezia, hematemesis or pyrosis   Denies any frequency, urgency, hesitancy or incontinence  Heme Denies bruising or bleeding easily  Endocrine Denies any history of diabetes or thyroid disease  Neuro Denies any focal motor or sensory deficits    PHYSICAL:   VITALS:  height is 5' 10\" (1.778 m) and weight is 160 lb (72.6 kg). His oral temperature is 97.6 °F (36.4 °C). His blood pressure is 144/68 (abnormal) and his pulse is 83. His respiration is 20 and oxygen saturation is 95%. CONSTITUTIONAL: Alert and oriented times 1, base line, no acute distress and cooperative to examination. HEENT: Head is normocephalic, atraumatic.  EOMI, PERRLA  NECK: Soft, trachea midline and straight  LUNGS: Chest expands equally bilaterally upon respiration, no accessory muscle used. Ausculation reveals no wheezes, rales or rhonchi. CARDIOVASCULAR: Heart sounds are normal.  Regular rate and rhythm without murmur, gallop or rub. ABDOMEN: soft, nontender, nondistended, no masses or organomegaly, no hernias palpable, no guarding or peritoneal signs. Bowel sounds are present in all four quadrants Scars are consistent with previous surgical history. NEUROLOGIC: CN II-XII are grossly intact. There are no focalizing motor or sensory deficits  EXTREMITIES: no cyanosis, clubbing or edema    LABS:     Recent Labs     09/09/22  1507   WBC 6.4   HGB 13.3   HCT 41.3         K 3.8      CO2 28   BUN 14   CREATININE 0.83   CALCIUM 9.7   AST 23   ALT 22   BILITOT 0.3     Recent Labs     09/09/22  1507   ALKPHOS 108   ALT 22   AST 23   BILITOT 0.3   LABALBU 4.3         RADIOLOGY:        Cervical spine CT: Acute mildly displaced fracture involving the dense   process of C2 which appears to be 4 mm posteriorly displaced. There is   associated moderate thickening of the transverse ligament with indentation of   the anterior aspect of the spinal cord. Mildly displaced fracture of anterior arch of C1. Bilateral minimally displaced fractures of the lateral arch of C1 at the   level of foramen transversarium. Considering the location, CTA of the neck   is recommended for further evaluation of vertebral arteries. There is associated traumatic anterolisthesis of C1-C2. For further   evaluation of the spinal cord, cervical spine MRI is recommended. Head CT: No evidence for acute intracranial hemorrhage, territorial   infarction or intracranial mass lesion. Mild chronic microangiopathic ischemic disease. Mild generalized volume loss. Complete opacification left maxillary, left frontal sinus and left ethmoidal   air cells.  There is suggestion of a calcified obstructing lesion within the   left

## 2022-09-10 VITALS
TEMPERATURE: 97.6 F | RESPIRATION RATE: 16 BRPM | OXYGEN SATURATION: 95 % | BODY MASS INDEX: 22.9 KG/M2 | HEART RATE: 83 BPM | SYSTOLIC BLOOD PRESSURE: 144 MMHG | HEIGHT: 70 IN | DIASTOLIC BLOOD PRESSURE: 68 MMHG | WEIGHT: 160 LBS

## 2022-09-10 PROBLEM — S12.111A CLOSED POSTERIOR DISPLACED TYPE II DENS FRACTURE (HCC): Status: ACTIVE | Noted: 2022-09-10

## 2022-09-10 LAB
EKG ATRIAL RATE: 78 BPM
EKG P AXIS: 91 DEGREES
EKG P-R INTERVAL: 132 MS
EKG Q-T INTERVAL: 384 MS
EKG QRS DURATION: 82 MS
EKG QTC CALCULATION (BAZETT): 437 MS
EKG R AXIS: 48 DEGREES
EKG T AXIS: 22 DEGREES
EKG VENTRICULAR RATE: 78 BPM

## 2022-09-10 PROCEDURE — 93010 ELECTROCARDIOGRAM REPORT: CPT | Performed by: INTERNAL MEDICINE

## 2022-09-10 NOTE — PLAN OF CARE
Upright Cervical films reviewed with attending, Dr. Ge Alvarez. Patient clear for discharge back to rehab facility pending transportation.

## 2022-09-10 NOTE — ED NOTES
[facility] is scheduled to transport patient at 1am, pending medical clearance. Patient is medically cleared to transfer back to [facility]. Paperwork given to [facility].      [facility]  09/09/22 2523       [facility]  09/09/22 7894       [facility]  09/09/22 6620

## 2022-09-10 NOTE — DISCHARGE INSTRUCTIONS
Return to the emergency department if you begin experiencing weakness or numbness sensation in her extremities, dizziness or lightheadedness, changes in vision, loss of bowel or bladder, chest pain or shortness of breath. Also return if you suffer from another fall. Please continuously wear your c-collar until you are evaluated by your neurosurgeon at [facility].